# Patient Record
(demographics unavailable — no encounter records)

---

## 2025-07-28 NOTE — PHYSICAL EXAM
[No Acute Distress] : no acute distress [Normal Sclera/Conjunctiva] : normal sclera/conjunctiva [PERRL] : pupils equal round and reactive to light [Normal Outer Ear/Nose] : the outer ears and nose were normal in appearance [No JVD] : no jugular venous distention [No Respiratory Distress] : no respiratory distress  [Clear to Auscultation] : lungs were clear to auscultation bilaterally [No Accessory Muscle Use] : no accessory muscle use [Normal Rate] : normal rate  [Regular Rhythm] : with a regular rhythm [No Edema] : there was no peripheral edema [Soft] : abdomen soft [Non Tender] : non-tender [No Joint Swelling] : no joint swelling [No Rash] : no rash [Normal Affect] : the affect was normal [Normal Insight/Judgement] : insight and judgment were intact [de-identified] : elderly female [de-identified] : walker, wheelchair for ambulation [de-identified] : pleasant, alert, verbal

## 2025-07-28 NOTE — ASSESSMENT
[FreeTextEntry1] : Ms. Adalberto Jimenez is a 89 yr old female with a pmhx of CAD s/p stent, HTN, DMII, admitted to [place] 7/16-7/17 for gastroenteritis, received IV fluids with improvement and discharged back home. Seen today for follow up.  -Lung nodules - PCP for ongoing monitoring -Follow up with PCP Dr. Disla -Follow up with cardiology Dr. Ojeda -Follow up with dermatology -Follow up with ENT  Educated patient and family extensively on fall and safety precautions, aspiration precautions, good skin care and importance to return to ED for any change in mental status or worsening s/s as reviewed. Reinforced provider follow up and medication compliance. 24/7 TCM contact provided and encouraged to call with any questions or concerns.

## 2025-07-28 NOTE — COUNSELING
[Fall prevention counseling provided] : Fall prevention counseling provided [Adequate lighting] : Adequate lighting [No throw rugs] : No throw rugs [Use proper foot wear] : Use proper foot wear [Use recommended devices] : Use recommended devices [FreeTextEntry1] : walker

## 2025-07-28 NOTE — REVIEW OF SYSTEMS
[Fever] : no fever [Chills] : no chills [Fatigue] : fatigue [Shortness Of Breath] : no shortness of breath [Wheezing] : no wheezing [Cough] : cough [Joint Pain] : joint pain [Muscle Weakness] : muscle weakness [Itching] : Itching [Negative] : Genitourinary

## 2025-07-28 NOTE — HEALTH RISK ASSESSMENT
[No] : No [No falls in past year] : Patient reported no falls in the past year [Assistive Device] : Patient uses an assistive device [de-identified] : cardiology [de-identified] : walker, wheelchair

## 2025-07-28 NOTE — HISTORY OF PRESENT ILLNESS
[FreeTextEntry2] : Ms. Adalberto Jimenez is a 89 yr old female with a pmhx of CAD s/p stent, HTN, DMII, admitted to [place] 7/16-7/17 for gastroenteritis, received IV fluids with improvement and discharged back home. Seen today for follow up.  Upon arrival, patient observed resting, appears pleasant and in nad, DIL Chandani and HHA present. Since discharge, she is feeling better in terms of GI symptoms, resolved. Family reports patient with coughing with thin liquids, has imaging scheduled Monday and plan to see ENT after.  Advised to use thick-it, aspiration and dysphagia precautions reviewed. Eating soft, pureed meals, denies issues with swallowing pills. Patient followed up with PCP Dr. Disla and Cardiologist Dr. Ojeda last week. States Amlodipine 5mg > 10mg, ASA and ezetimibe stopped.  Endorses dry skin, itching x 1  year - was seeing dermatology prior, will follow up. Taking Allegra per pcp.  Endorses muscle weakness since hip surgery 7 years ago,  has walker and wheelchair for appts. Has CDPAP 8 hours x 5 days, has RN assessment today?  Discussed home care services - family will reach out to PCP to discuss  Lung nodules - PCP Denies any fever/chills, SOB or CP.  Full code.   As copied from [facility] Hospital course: "Hospital Course This is a 89 yr old female with a pmhx of CAD s/p stent, HTN, HLD, DMII who presents to the ED due to vomiting and diarrhea since yest. Patient is a poor historian and rest of history and is taken from son Soheila who is bedside He states that patient since yest has had 3-4 episodes of vomiting which were non blood tinged and a few episodes of non bloody diarrhea. Patient also has felt more lethargic over the past few days per the son. Patient denies any recent travel, fever, myalgia, chills, sick contacts, abd pain. Patient unsure about any previous colonoscopy and or endoscopy.  Pt was admitted for Gastroenteritis.  Pt received 2 L fluids in ED, followed by continuous IV fluids with improvement in her symptoms.    Pt was evaluated by her outpt PCP who also discussed pulm nodule with pt 's son at bedside. RML nodules 8mm ? Granulomas CAD with angina with old MI with Stent"